# Patient Record
Sex: MALE | Race: WHITE | NOT HISPANIC OR LATINO
[De-identification: names, ages, dates, MRNs, and addresses within clinical notes are randomized per-mention and may not be internally consistent; named-entity substitution may affect disease eponyms.]

---

## 2022-11-08 ENCOUNTER — APPOINTMENT (OUTPATIENT)
Dept: ORTHOPEDIC SURGERY | Facility: CLINIC | Age: 72
End: 2022-11-08

## 2022-11-08 VITALS — WEIGHT: 175 LBS | BODY MASS INDEX: 25.92 KG/M2 | HEIGHT: 69 IN

## 2022-11-08 DIAGNOSIS — Z85.46 PERSONAL HISTORY OF MALIGNANT NEOPLASM OF PROSTATE: ICD-10-CM

## 2022-11-08 DIAGNOSIS — Z78.9 OTHER SPECIFIED HEALTH STATUS: ICD-10-CM

## 2022-11-08 DIAGNOSIS — Z86.79 PERSONAL HISTORY OF OTHER DISEASES OF THE CIRCULATORY SYSTEM: ICD-10-CM

## 2022-11-08 DIAGNOSIS — Z87.19 PERSONAL HISTORY OF OTHER DISEASES OF THE DIGESTIVE SYSTEM: ICD-10-CM

## 2022-11-08 DIAGNOSIS — Z63.5 DISRUPTION OF FAMILY BY SEPARATION AND DIVORCE: ICD-10-CM

## 2022-11-08 PROBLEM — Z00.00 ENCOUNTER FOR PREVENTIVE HEALTH EXAMINATION: Status: ACTIVE | Noted: 2022-11-08

## 2022-11-08 PROCEDURE — 73030 X-RAY EXAM OF SHOULDER: CPT | Mod: RT

## 2022-11-08 PROCEDURE — 99214 OFFICE O/P EST MOD 30 MIN: CPT

## 2022-11-08 PROCEDURE — 99204 OFFICE O/P NEW MOD 45 MIN: CPT

## 2022-11-08 RX ORDER — ATENOLOL 50 MG/1
50 TABLET ORAL
Qty: 90 | Refills: 0 | Status: ACTIVE | COMMUNITY
Start: 2021-09-13

## 2022-11-08 RX ORDER — PANTOPRAZOLE 40 MG/1
40 TABLET, DELAYED RELEASE ORAL
Qty: 90 | Refills: 0 | Status: ACTIVE | COMMUNITY
Start: 2021-09-13

## 2022-11-08 RX ORDER — DOXAZOSIN 4 MG/1
4 TABLET ORAL
Qty: 90 | Refills: 0 | Status: ACTIVE | COMMUNITY
Start: 2022-03-03

## 2022-11-08 SDOH — SOCIAL STABILITY - SOCIAL INSECURITY: DISRUPTION OF FAMILY BY SEPARATION AND DIVORCE: Z63.5

## 2022-11-08 NOTE — REASON FOR VISIT
[FreeTextEntry2] : here today for right shoulder pain.  was seeing Dr Dodd.  has had CSI x2  last 1/22/22.  c/o cant sleep at night, pain is increasing, pain radiating down right arm.

## 2022-11-08 NOTE — PHYSICAL EXAM
[There are no fractures, subluxations or dislocations. No significant abnormalities are seen] : There are no fractures, subluxations or dislocations. No significant abnormalities are seen [Type 2 acromion] : Type 2 acromion [Glenohumeral arthritis] : Glenohumeral arthritis [] : no scapular winging [Right] : right shoulder [FreeTextEntry9] : Flex 140* IR L5* EXT  40* ABD 80*  [de-identified] : biceps 4+, subscap 4+, super 2+ with pain, intra 4 [de-identified] : decreased in median distribution and positive carpal tunnel tinals [FreeTextEntry1] : joint space narrowing. Sclerotic Bone , GH  bone on bone   Osteophyte formation [TWNoteComboBox7] : active forward flexion 110 degrees [de-identified] : active abduction 120 degrees [TWNoteComboBox6] : internal rotation L5 [de-identified] : external rotation 20 degrees

## 2022-11-08 NOTE — DISCUSSION/SUMMARY
[de-identified] : Patient referred to Dr Lundberg for further evaluation regarding right shoulder replacement\par Patient advised to follow up with Dr Braswell for wrist

## 2022-11-11 ENCOUNTER — APPOINTMENT (OUTPATIENT)
Dept: ORTHOPEDIC SURGERY | Facility: CLINIC | Age: 72
End: 2022-11-11

## 2022-11-11 PROCEDURE — 20610 DRAIN/INJ JOINT/BURSA W/O US: CPT | Mod: RT

## 2022-11-11 PROCEDURE — 99214 OFFICE O/P EST MOD 30 MIN: CPT | Mod: 25

## 2022-11-11 NOTE — IMAGING
[de-identified] : Right Shoulder Examination:\par \par • Constitutional: \par    - In no acute distress: yes\par    - Alert and oriented (person, place, time): yes \par \par • Inspection and Palpation: \par    - Skin: intact\par    - Swelling: none\par    - Glenohumeral joint line tenderness: negative\par    - Bicipital groove tenderness: negative\par    - AC joint tenderness: negative\par    - Scapulothoracic tenderness: negative\par    - Cervical spine tenderness: negative\par    - Palpable lymphadenopathy: none \par    - Peripheral edema: none\par \par • Range of Motion (in degrees):\par    - Active forward elevation: 160\par    - Passive forward elevation: 160\par    - External rotation at the side: 40\par    - Internal rotation behind the back: sacrum \par 	\par • Stability:\par    - Apprehension sign: negative\par    - Sulcus sign: negative\par \par • Neurovascular: \par    - Atrophy of rotator cuff: absent \par    - Forward elevation strength (out of 5): 5\par    - External rotation strength at the side (out of 5): 5\par    - Internal rotation strength at the side (out of 5): 5\par    - Abduction strength (out of 5): 5\par    - Sensation to light touch: intact in axillary, median, radial and ulnar distributions\par    - Capillary refill: less than 2 seconds in fingers\par \par • Special Tests:\par    - Stark impingement test: positive\par    - Cross body adduction test: negative \par    - [place]'s test: negative \par    - Speed’s test: negative\par    - Spurling’s test: negative\par    - Belly press test: normal\par    - Hornblower sign: negative\par    - Neck examination: painless range of motion\par \par • Comments:\par    - None \par \par  [Right] : right shoulder [Outside films reviewed] : Outside films reviewed [FreeTextEntry1] : severe glenohumeral joint space narrowing with inferior osteophyte formation. no axillary view is available and patient defers further imaging

## 2022-11-11 NOTE — DISCUSSION/SUMMARY
[de-identified] : Assessment\par \par The patient presents with history, examination and imaging that are most consistent with a diagnosis of:\par right glenohumeral osteoarthritis with exacerbation\par \par We discussed the diagnosis and potential treatment options. He is a candidate for anatomic versus reverse shoulder replacement. We discussed the risks and benefits of each as well as the post-operative rehab. We would need an MRI to evaluate his rotator cuff and CT scan to evaluate glenoid deformity prior to surgery. \par \par Patient would like to prioritize his carpal tunnel surgery and will opt for injection today and home exercise program. We discussed the delay of 3 months between injection and arthroplasty. \par \par The patient is in agreement with the treatment plan and all questions were answered. \par \par ..........\par \par Plan\par \par - Counseling: Patient recommended to modify their activities until symptoms resolve. \par - Home Exercise Program: Packet with exercises from [employer] provided to patient in the office today. \par - Injection: Corticosteroid injection performed today in the office. Refer to procedure section for further details.\par - Follow-up: 6 to 8 weeks if failing to improve\par \par ..........\par \par Medical Decision Making\par \par • Problem:\par    - Chronic illness with exacerbation - moderate\par • Data:\par    - Review of available external records - low\par • Risk:\par    - Injection - moderate\par • MDM Level:\par    - Level 4\par

## 2022-11-11 NOTE — HISTORY OF PRESENT ILLNESS
[de-identified] : The patient presents to clinic for evaluation of the complaint described below.\par \par • Visit Details:\par    - Visit type: new \par    - Worker’s compensation: no\par    - No-fault: no\par \par • Patient Demographics:\par    - Age: 72\par    - Occupation: retired \par    - Sex: male\par \par • Symptom Details: \par    - Laterality: right \par    - Body part: shoulder\par    - Duration: 3 years\par    - Pain severity (out of 10): 7/10\par    - Pain timing: constant \par    - Pain quality: sharp, throbbing\par    - Pain interferes with sleep: yes \par    - Pain radiates distally: yes \par    - Associated with swelling: no\par    - Associated with catching and locking: no\par    - Associated with decreased motion: yes \par    - Associated with numbness: no\par    - Worse with activity: yes\par    - Improves with rest: yes\par \par • Treatment Details:\par    - Physical therapy: no\par    - Anti-inflammatory medication: no\par    - Narcotic medication: no\par    - Corticosteroid injection: yes x2, last in January 2022\par \par • Comments:\par    - Pain is in the front of the shoulder. He had fallen in Jan and landed on his shoulder. Pain is constant and has been getting worse. He was having chronic on and off pain before that. Reports good response to one injection from Dr. Dodd, but no response to the other one. He states he needs right carpal tunnel surgery with Dr. Hooker. No cardiac history, non smoker, no DM. \par

## 2022-11-11 NOTE — PROCEDURE
[FreeTextEntry3] : Procedure Note\par \par • Injection Details: \par    - Laterality: right \par    - Body part: shoulder\par    - Anatomic location: joint \par \par • Injection Contents: \par    - 1st Medication: Kenalog, 40 mg\par    - 2nd Medication: 1% lidocaine, 5 mL\par \par • Procedure Narrative:\par    - Informed consent was provided for injection of the specified location. \par    - Patient informed of corticosteroid risks including hyperglycemia, infection and skin discoloration. \par    - The above specified medications were injected using aseptic technique. \par    - A sterile dressing was applied. \par    - There were no complications. \par \par

## 2022-11-29 ENCOUNTER — APPOINTMENT (OUTPATIENT)
Dept: ORTHOPEDIC SURGERY | Facility: CLINIC | Age: 72
End: 2022-11-29

## 2023-01-06 ENCOUNTER — APPOINTMENT (OUTPATIENT)
Dept: ORTHOPEDIC SURGERY | Facility: CLINIC | Age: 73
End: 2023-01-06
Payer: MEDICARE

## 2023-01-06 PROCEDURE — 99214 OFFICE O/P EST MOD 30 MIN: CPT | Mod: 25

## 2023-01-06 PROCEDURE — 20610 DRAIN/INJ JOINT/BURSA W/O US: CPT | Mod: RT

## 2023-01-06 NOTE — DISCUSSION/SUMMARY
[de-identified] : (Assessment)\par \par History, clinical examination and imaging were most consistent with:\par -right glenohumeral osteoarthritis\par \par The diagnosis was explained in detail. The potential non-surgical and surgical treatments were reviewed. The relative risks and benefits of each option were considered relative to the patient’s age, activity level, medical history, symptom severity and previously attempted treatments. \par \par -The patient has persistent symptoms in spite of non-surgical treatment. Their symptoms have placed a significant burden on their quality of life. The risks, benefits and alternatives of shoulder replacement were discussed, along with the expected timeline for rehabilitation and specifics regarding the most common complications. We discussed the need for pre-operative CT scan. We discussed the rationale and complication profiles for TSA versus RSA, particularly in regards to rotator cuff insufficiency in the setting of TSA and advanced age. He is interested in RSA as a definitive treatment option.\par -He is not interested in surgery until the late spring or early summer to arrange family support. He would like to proceed today with a repeat cortisone injection for symptom relief. he will continue the home exercise program. follow up in 2 months to discuss surgical scheduling and CT scan. \par \par \par (Plan)\par -Cortisone injection performed today\par -Continue home exercise program\par -Follow up in 2 months\par \par (MDM) \par \par Problem Complexity\par -Moderate: chronic illness with exacerbation\par \par Risk\par -Moderate: injection\par \par \par (Injection Procedure Note)\par \par Laterality \par -Right \par \par Location\par -Glenohumeral joint\par \par Contents\par -40 mg kenalog \par -5 mL of 1% lidocaine\par \par Narrative\par -Discussed possible risks of corticosteroid injection including hyperglycemia, infection and skin discoloration. \par -Discussed that due to infection risk, an interval between injection and any future surgery is 6 weeks for arthroscopy and 3 months for arthroplasty.\par -Informed consent was obtained.\par -Injection performed using aseptic technique. Tolerated well with no complications. \par \par

## 2023-01-06 NOTE — HISTORY OF PRESENT ILLNESS
[de-identified] : (Follow-Up Visit) \par \par Symptom Details\par -Body part: RT shoulder \par -Pain score at rest: 4/10\par -Pain score with activity: 5/10\par -Improvement since last visit: pain is slightly improved, but ROM is still limited. he had the CTR in december. he is interested in shoulder replacement but needs to talk to his family about timing, likely not until late spring or summer. \par \par Interval Treatment Details\par -Activity modification: yes\par -Medication: no\par -Physical therapy: previously \par -Home exercise program: every other day\par -Injection: had some relief, he is interested in another one today\par -MRI: no\par \par \par Please refer to previous office visit notes for additional history.\par

## 2023-01-06 NOTE — IMAGING
[Right] : right shoulder [Outside films reviewed] : Outside films reviewed [FreeTextEntry1] : severe glenohumeral joint space narrowing with inferior osteophyte formation. no axillary view is available and patient defers further imaging [de-identified] : Right Shoulder Examination:\par \par • Constitutional: \par    - In no acute distress: yes\par    - Alert and oriented (person, place, time): yes \par \par • Inspection and Palpation: \par    - Skin: intact\par    - Swelling: none\par    - Glenohumeral joint line tenderness: negative\par    - Bicipital groove tenderness: negative\par    - AC joint tenderness: negative\par    - Scapulothoracic tenderness: negative\par    - Cervical spine tenderness: negative\par    - Palpable lymphadenopathy: none \par    - Peripheral edema: none\par \par • Range of Motion (in degrees):\par    - Active forward elevation: 160\par    - Passive forward elevation: 160\par    - External rotation at the side: 40\par    - Internal rotation behind the back: sacrum \par 	\par • Stability:\par    - Apprehension sign: negative\par    - Sulcus sign: negative\par \par • Neurovascular: \par    - Atrophy of rotator cuff: absent \par    - Forward elevation strength (out of 5): 5\par    - External rotation strength at the side (out of 5): 5\par    - Internal rotation strength at the side (out of 5): 5\par    - Abduction strength (out of 5): 5\par    - Sensation to light touch: intact in axillary, median, radial and ulnar distributions\par    - Capillary refill: less than 2 seconds in fingers\par \par • Special Tests:\par    - Stark impingement test: positive\par    - Cross body adduction test: negative \par    - [place]'s test: negative \par    - Speed’s test: negative\par    - Spurling’s test: negative\par    - Belly press test: normal\par    - Hornblower sign: negative\par    - Neck examination: painless range of motion\par \par • Comments:\par    - None \par \par

## 2023-03-03 ENCOUNTER — APPOINTMENT (OUTPATIENT)
Dept: ORTHOPEDIC SURGERY | Facility: CLINIC | Age: 73
End: 2023-03-03
Payer: MEDICARE

## 2023-03-03 PROCEDURE — 99214 OFFICE O/P EST MOD 30 MIN: CPT

## 2023-03-03 NOTE — HISTORY OF PRESENT ILLNESS
[de-identified] : (Follow-Up Visit) \par \par Symptom Details\par -Body part: RT shoulder \par -Pain score at rest: 4/10\par -Pain score with activity: 5/10\par -Improvement since last visit: still has significant pain and limited motion, difficulty with ADL's. he is interested in discussing surgery. has made arrangements with his family, would like to discuss dates in april\par \par Interval Treatment Details\par -Activity modification: yes\par -Medication: no\par -Physical therapy: previously \par -Home exercise program: every other day\par -Injection: no\par -MRI: no\par \par \par Please refer to previous office visit notes for additional history.\par

## 2023-03-03 NOTE — DISCUSSION/SUMMARY
[de-identified] : (Assessment)\par \par History, clinical examination and imaging were most consistent with:\par -right glenohumeral osteoarthritis\par \par The diagnosis was explained in detail. The potential non-surgical and surgical treatments were reviewed. The relative risks and benefits of each option were considered relative to the patient’s age, activity level, medical history, symptom severity and previously attempted treatments. \par \par -The patient has persistent symptoms in spite of non-surgical treatment. Their symptoms have placed a significant burden on their quality of life. The risks, benefits and alternatives of shoulder replacement were discussed, along with the expected timeline for rehabilitation and specifics regarding the most common complications. We discussed the need for pre-operative CT scan. We discussed the rationale and complication profiles for TSA versus RSA, particularly in regards to rotator cuff insufficiency in the setting of TSA and advanced age. He is interested in RSA as a definitive treatment option.\par \par \par (Surgical Plan)\par \par -Procedure: right reverse shoulder arthroplasty\par -Tentative date: april\par -Medical clearance: yes\par -Cardiac clearance: no\par -A CT scan of the shoulder without contrast was ordered for pre-surgical planning. \par -Patient will provide copy of hematology evaluation as he has low platelets without a known cause, will likely have platelets on standby pre-procedure. \par -Follow up: 1-2 weeks after surgery\par \par \par (MDM) \par \par Problem Complexity\par -Moderate: chronic illness with exacerbation\par \par Risk\par -Moderate: pre-surgical discussion

## 2023-03-03 NOTE — IMAGING
[de-identified] : Right Shoulder Examination:\par \par • Constitutional: \par  - In no acute distress: yes\par  - Alert and oriented (person, place, time): yes \par \par • Inspection and Palpation: \par  - Skin: intact\par  - Swelling: none\par  - Glenohumeral joint line tenderness: negative\par  - Bicipital groove tenderness: negative\par  - AC joint tenderness: negative\par  - Scapulothoracic tenderness: negative\par  - Cervical spine tenderness: negative\par  - Palpable lymphadenopathy: none \par  - Peripheral edema: none\par \par • Range of Motion (in degrees):\par  - Active forward elevation: 160\par  - Passive forward elevation: 160\par  - External rotation at the side: 40\par  - Internal rotation behind the back: sacrum \par 	\par • Stability:\par  - Apprehension sign: negative\par  - Sulcus sign: negative\par \par • Neurovascular: \par  - Atrophy of rotator cuff: absent \par  - Forward elevation strength (out of 5): 5-\par  - External rotation strength at the side (out of 5): 5\par  - Internal rotation strength at the side (out of 5): 5\par  - Abduction strength (out of 5): 5\par  - Sensation to light touch: intact in axillary, median, radial and ulnar distributions\par  - Capillary refill: less than 2 seconds in fingers\par \par • Special Tests:\par  - Stark impingement test: positive\par  - Cross body adduction test: negative \par  - [place]'s test: negative \par  - Speed’s test: negative\par  - Spurling’s test: negative\par  - Belly press test: normal\par  - Hornblower sign: negative\par  - Neck examination: painless range of motion\par \par • Comments:\par  - None \par

## 2023-03-06 ENCOUNTER — FORM ENCOUNTER (OUTPATIENT)
Age: 73
End: 2023-03-06

## 2023-03-06 ENCOUNTER — RESULT REVIEW (OUTPATIENT)
Age: 73
End: 2023-03-06

## 2023-03-19 ENCOUNTER — FORM ENCOUNTER (OUTPATIENT)
Age: 73
End: 2023-03-19

## 2023-03-20 ENCOUNTER — NON-APPOINTMENT (OUTPATIENT)
Age: 73
End: 2023-03-20

## 2023-04-03 ENCOUNTER — NON-APPOINTMENT (OUTPATIENT)
Age: 73
End: 2023-04-03

## 2023-04-04 ENCOUNTER — NON-APPOINTMENT (OUTPATIENT)
Age: 73
End: 2023-04-04

## 2023-04-05 ENCOUNTER — FORM ENCOUNTER (OUTPATIENT)
Age: 73
End: 2023-04-05

## 2023-04-06 ENCOUNTER — FORM ENCOUNTER (OUTPATIENT)
Age: 73
End: 2023-04-06

## 2023-04-11 ENCOUNTER — FORM ENCOUNTER (OUTPATIENT)
Age: 73
End: 2023-04-11

## 2023-04-17 ENCOUNTER — FORM ENCOUNTER (OUTPATIENT)
Age: 73
End: 2023-04-17

## 2023-04-17 DIAGNOSIS — M19.011 PRIMARY OSTEOARTHRITIS, RIGHT SHOULDER: ICD-10-CM

## 2023-04-17 RX ORDER — FAMOTIDINE 20 MG/1
20 TABLET, FILM COATED ORAL
Qty: 14 | Refills: 0 | Status: ACTIVE | COMMUNITY
Start: 2023-04-17 | End: 1900-01-01

## 2023-04-17 RX ORDER — ASPIRIN 81 MG/1
81 TABLET, COATED ORAL
Qty: 60 | Refills: 0 | Status: ACTIVE | COMMUNITY
Start: 2023-04-17 | End: 1900-01-01

## 2023-04-17 RX ORDER — MELOXICAM 15 MG/1
15 TABLET ORAL
Qty: 30 | Refills: 0 | Status: ACTIVE | COMMUNITY
Start: 2023-04-17 | End: 1900-01-01

## 2023-04-17 RX ORDER — POLYETHYLENE GLYCOL 3350 17 G/17G
17 POWDER, FOR SOLUTION ORAL
Qty: 7 | Refills: 0 | Status: ACTIVE | COMMUNITY
Start: 2023-04-17 | End: 1900-01-01

## 2023-04-17 RX ORDER — OXYCODONE 5 MG/1
5 TABLET ORAL
Qty: 20 | Refills: 0 | Status: ACTIVE | COMMUNITY
Start: 2023-04-17 | End: 1900-01-01

## 2023-04-18 ENCOUNTER — NON-APPOINTMENT (OUTPATIENT)
Age: 73
End: 2023-04-18

## 2023-04-19 ENCOUNTER — RESULT REVIEW (OUTPATIENT)
Age: 73
End: 2023-04-19

## 2023-04-19 ENCOUNTER — FORM ENCOUNTER (OUTPATIENT)
Age: 73
End: 2023-04-19

## 2023-04-19 ENCOUNTER — APPOINTMENT (OUTPATIENT)
Dept: ORTHOPEDIC SURGERY | Facility: HOSPITAL | Age: 73
End: 2023-04-19
Payer: MEDICARE

## 2023-04-19 PROCEDURE — 23472 RECONSTRUCT SHOULDER JOINT: CPT | Mod: RT

## 2023-04-25 ENCOUNTER — TRANSCRIPTION ENCOUNTER (OUTPATIENT)
Age: 73
End: 2023-04-25

## 2023-05-01 ENCOUNTER — APPOINTMENT (OUTPATIENT)
Dept: ORTHOPEDIC SURGERY | Facility: CLINIC | Age: 73
End: 2023-05-01
Payer: MEDICARE

## 2023-05-01 PROCEDURE — 73030 X-RAY EXAM OF SHOULDER: CPT | Mod: FY,RT

## 2023-05-01 PROCEDURE — 99024 POSTOP FOLLOW-UP VISIT: CPT

## 2023-05-11 ENCOUNTER — RESULT REVIEW (OUTPATIENT)
Age: 73
End: 2023-05-11

## 2023-05-11 NOTE — ADDENDUM
[FreeTextEntry1] : 5/11: Patient contacted the office reporting right calf swelling. No shortness of breath or fevers. He was provided with a script for Duplex to rule out DVT. If positive he will seek immediate medical care from ER or PMD to begin anti-coagulation. If negative he will follow up with his PMD.

## 2023-05-11 NOTE — IMAGING
[Right] : right shoulder [Components well fixed, in good position] : Components well fixed, in good position [de-identified] : (Physical Exam: Shoulder)\par \par Laterality is right \par \par Patient is in no acute distress, alert and oriented\par Sensation is grossly intact to light touch in the hand\par Axillary sensation is intact in the shoulder\par Motor function is 5/5 in the hand\par Deltoid and biceps are firing\par Capillary refill is less than 2 seconds in the fingers\par Lymphadenopathy is not present\par Peripheral edema is not present\par \par Skin is intact \par Incisions are healing well\par Swelling is not present \par Atrophy is not present\par Deformity of the biceps is not present \par  [FreeTextEntry1] : RSA with no fracture, dislocation or loosening

## 2023-05-11 NOTE — HISTORY OF PRESENT ILLNESS
[de-identified] : (History of Present Illness)\par \par Date of surgery was 04/19/2023\par Surgery performed was RT reverse shoulder arthroplasty \par Current pain score is 8/10\par Current medications taken are meloxicam and famotidine. \par Physical therapy is being performed at none scheduled yet\par Reports that yesterday he had some pain in his arm when he moved it but it has been resolving\par

## 2023-05-11 NOTE — DISCUSSION/SUMMARY
[de-identified] : (Assessment and Plan)\par \par The patient has made appropriate post-operative progress and agreed to proceed with the plan of care below. All questions were answered. \par \par -Patient will proceed with physical therapy as per the post-operative protocol.\par -Ibuprofen as needed for pain, GI precautions discussed\par -Aspirin for DVT prophylaxis until 1 month post-operatively\par -Follow up in 4 weeks with XR\par

## 2023-05-31 ENCOUNTER — APPOINTMENT (OUTPATIENT)
Dept: ORTHOPEDIC SURGERY | Facility: CLINIC | Age: 73
End: 2023-05-31
Payer: MEDICARE

## 2023-05-31 PROCEDURE — 73030 X-RAY EXAM OF SHOULDER: CPT | Mod: FY,RT

## 2023-05-31 PROCEDURE — 99024 POSTOP FOLLOW-UP VISIT: CPT

## 2023-05-31 RX ORDER — MELOXICAM 15 MG/1
15 TABLET ORAL
Qty: 90 | Refills: 0 | Status: ACTIVE | COMMUNITY
Start: 2023-05-31 | End: 1900-01-01

## 2023-05-31 NOTE — PHYSICAL EXAM
[Right] : right shoulder [de-identified] : (Physical Exam: Shoulder)\par \par Laterality is right \par \par Patient is in no acute distress, alert and oriented\par Sensation is grossly intact to light touch in the hand\par Axillary sensation is intact in the shoulder\par Motor function is 5/5 in the hand\par Deltoid and biceps are firing\par Capillary refill is less than 2 seconds in the fingers\par Lymphadenopathy is not present\par Peripheral edema is not present\par \par Mild tenderness of the deltoid \par No focal tenderness of the acromion or scapular spine\par \par Skin is intact \par Incisions are healing well\par Swelling is not present \par Atrophy is not present\par Deformity of the biceps is not present \par \par Active forward elevation is 90\par Passive forward elevation is 130\par External rotation at the side is 30\par Internal rotation behind the back is to the level of side pocket\par \par Forward elevation strength is 5-/5\par External rotation strength at the side is 5-/5 \par Internal rotation strength at the side is 5/5 \par Deltoid strength of anterior, posterior and lateral heads is 5/5\par  [FreeTextEntry1] : reverse shoulder replacement with no interval change in position, no loosening or fracture. there is no evidence of acromial or scapular spine fracture

## 2023-05-31 NOTE — HISTORY OF PRESENT ILLNESS
[de-identified] : (History of Present Illness)\par \par Date of surgery was 04/19/2023\par Surgery performed was RT reverse shoulder arthroplasty \par Current pain score is 4/10\par Current medications taken are none\par Physical therapy is being performed at [facility]\par Patient reports that after a vigorous PT session last week he noted some lateral arm soreness that has not completely resolved

## 2023-05-31 NOTE — DISCUSSION/SUMMARY
[de-identified] : (Assessment and Plan)\par \par Patient notes deltoid soreness after increasing his PT regimen. Most consistent with deltoid strain and early acromial stress reaction. I recommended stopping PT for 2 weeks and using the sling during the day. We will restart meloxicam to reduce inflammation.\par \par Follow up in 2 weeks, if symptoms resolve he can resume PT. If symptoms persist or worsen we would consider CT scan. \par \par All questions were answered. \par . \par

## 2023-06-19 ENCOUNTER — APPOINTMENT (OUTPATIENT)
Dept: ORTHOPEDIC SURGERY | Facility: CLINIC | Age: 73
End: 2023-06-19
Payer: MEDICARE

## 2023-06-19 PROCEDURE — 73030 X-RAY EXAM OF SHOULDER: CPT | Mod: FY,RT

## 2023-06-19 PROCEDURE — 99024 POSTOP FOLLOW-UP VISIT: CPT

## 2023-06-19 RX ORDER — TRAMADOL HYDROCHLORIDE 50 MG/1
50 TABLET, COATED ORAL
Qty: 20 | Refills: 0 | Status: ACTIVE | COMMUNITY
Start: 2023-06-19 | End: 1900-01-01

## 2023-06-19 NOTE — HISTORY OF PRESENT ILLNESS
[de-identified] : (History of Present Illness)\par \par Date of surgery was 04/19/2023\par Surgery performed was RT reverse shoulder arthroplasty \par Current pain score is 5/10\par Current medications taken are none\par Physical therapy is being performed at [facility]\par Patient reports that he was using the sling, stopped PT and after a few days his lateral arm pain resolved\par He had resumed his home PT without pain or discomfort\par However, reports that he had a sudden movement 4 days ago trying to remove a tick from his contralateral arm\par he felt a sharp pain in his operative shoulder\par he states that his external rotation is greater since that event\par he still has some lateral arm soreness but it is not as severe\par he has been using the sling for comfort

## 2023-06-19 NOTE — IMAGING
[Right] : right shoulder [FreeTextEntry1] : reverse shoulder prosthesis in stable alignment, no fracture dislocation or loosening

## 2023-06-19 NOTE — PHYSICAL EXAM
[de-identified] : (Physical Exam: Shoulder)\par \par Laterality is right \par \par Patient is in no acute distress, alert and oriented\par Sensation is grossly intact to light touch in the hand\par Axillary sensation is intact in the shoulder\par Motor function is 5/5 in the hand\par Deltoid and biceps are firing\par Capillary refill is less than 2 seconds in the fingers\par Lymphadenopathy is not present\par Peripheral edema is not present\par \par Minimal tenderness of the deltoid \par No focal tenderness of the acromion or scapular spine\par \par Skin is intact \par Incisions are healing well\par Swelling is not present \par Atrophy is not present\par Deformity of the biceps is not present \par \par Active forward elevation is 90\par Passive forward elevation is 130\par External rotation at the side is 60\par Internal rotation behind the back is to the level of side pocket\par \par Forward elevation strength is 5-/5\par External rotation strength at the side is 5-/5 \par Internal rotation strength at the side is 5/5 \par Deltoid strength of anterior, posterior and lateral heads is 5/5\par  [Right] : right shoulder [FreeTextEntry1] : reverse shoulder replacement with no interval change in position, no loosening or fracture. there is no evidence of acromial or scapular spine fracture

## 2023-06-19 NOTE — DISCUSSION/SUMMARY
[de-identified] : (Assessment and Plan)\par \par Patient's deltoid soreness is improved but he had an exacerbation following a sudden movement last week. His symptoms are concerning for subscapularis rupture. We discussed that the reverse prosthesis can function without a subscapularis. \par \par Patient does not have focal acromion tenderness but we will proceed with CT scan to confirm the absence of acromial or scapular stress fracture. \par \par Patient will continue with meloxicam and take tramadol for breakthrough pain. He will continue the sling and can remove for pendulum and gentle range of motion exercises.\par \par Follow up when CT scan completed. If no fracture observed and pain is improved we will re-start formal PT to advance strengthening. Patient does not have any signs of symptoms concerning for infection with a mechanical etiology associated with his pain, and therefore infectious workup is deferred. \par \par All questions were answered. \par

## 2023-06-20 ENCOUNTER — NON-APPOINTMENT (OUTPATIENT)
Age: 73
End: 2023-06-20

## 2023-06-20 ENCOUNTER — RESULT REVIEW (OUTPATIENT)
Age: 73
End: 2023-06-20

## 2023-07-12 ENCOUNTER — APPOINTMENT (OUTPATIENT)
Dept: ORTHOPEDIC SURGERY | Facility: CLINIC | Age: 73
End: 2023-07-12
Payer: MEDICARE

## 2023-07-12 PROCEDURE — 99024 POSTOP FOLLOW-UP VISIT: CPT

## 2023-07-12 NOTE — HISTORY OF PRESENT ILLNESS
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

## 2023-07-12 NOTE — PHYSICAL EXAM
[Right] : right shoulder [de-identified] : (Physical Exam: Shoulder)\par \par Laterality is right \par \par Patient is in no acute distress, alert and oriented\par Sensation is grossly intact to light touch in the hand\par Axillary sensation is intact in the shoulder\par Motor function is 5/5 in the hand\par Deltoid and biceps are firing\par Capillary refill is less than 2 seconds in the fingers\par Lymphadenopathy is not present\par Peripheral edema is not present\par \par No tenderness of the deltoid \par No tenderness of the acromion or scapular spine\par Mild tenderness at AC joint\par \par Skin is intact \par Incisions are healing well\par Swelling is not present \par Atrophy is not present\par Deformity of the biceps is not present \par \par Active forward elevation is 130\par Passive forward elevation is 130\par External rotation at the side is 60\par Internal rotation behind the back is to the level of back pocket\par \par Forward elevation strength is 5-/5\par External rotation strength at the side is 5/5 \par Internal rotation strength at the side is 5/5 \par Deltoid strength of anterior, posterior and lateral heads is 5/5\par  [FreeTextEntry1] : reverse shoulder replacement with no interval change in position, no loosening or fracture. there is no evidence of acromial or scapular spine fracture

## 2023-07-12 NOTE — DATA REVIEWED
[CT Scan] : CT scan [Right] : of the right [Shoulder] : shoulder [Report was reviewed and noted in the chart] : The report was reviewed and noted in the chart [I reviewed the films/CD and agree] : I reviewed the films/CD and agree [FreeTextEntry1] : components in acceptable position, no loosening, no acromion or scapular spine fracture, moderate DJD of AC joint

## 2023-07-12 NOTE — DISCUSSION/SUMMARY
[de-identified] : (Assessment and Plan)\par \par Patient's symptoms are improved, likely from subscapularis rupture. We again discussed that the reverse prosthesis can function without a subscapularis. CT was negative for stress fracture. \par \par He has mild AC joint pain and recommended OTC NSAID as well as topical voltaren gel. We could consider CSI of the ACJ at a future visit if symptoms persisted. \par \par Patient can resume PT, we discussed gradual return to activity and limiting heavy lifting for the next 6 weeks. Patient will follow up in 2 months with XR. \par \par All questions were answered. \par

## 2023-09-13 ENCOUNTER — APPOINTMENT (OUTPATIENT)
Dept: ORTHOPEDIC SURGERY | Facility: CLINIC | Age: 73
End: 2023-09-13
Payer: MEDICARE

## 2023-09-13 VITALS — BODY MASS INDEX: 25.92 KG/M2 | HEIGHT: 69 IN | WEIGHT: 175 LBS

## 2023-09-13 PROCEDURE — 73030 X-RAY EXAM OF SHOULDER: CPT | Mod: FY,RT

## 2023-09-13 PROCEDURE — 99213 OFFICE O/P EST LOW 20 MIN: CPT

## 2024-04-19 ENCOUNTER — APPOINTMENT (OUTPATIENT)
Dept: ORTHOPEDIC SURGERY | Facility: CLINIC | Age: 74
End: 2024-04-19
Payer: MEDICARE

## 2024-04-19 DIAGNOSIS — Z47.1 AFTERCARE FOLLOWING JOINT REPLACEMENT SURGERY: ICD-10-CM

## 2024-04-19 DIAGNOSIS — Z96.611 AFTERCARE FOLLOWING JOINT REPLACEMENT SURGERY: ICD-10-CM

## 2024-04-19 PROCEDURE — 99213 OFFICE O/P EST LOW 20 MIN: CPT

## 2024-04-19 PROCEDURE — 73030 X-RAY EXAM OF SHOULDER: CPT | Mod: RT

## 2024-05-23 ENCOUNTER — APPOINTMENT (OUTPATIENT)
Dept: ORTHOPEDIC SURGERY | Facility: CLINIC | Age: 74
End: 2024-05-23
Payer: MEDICARE

## 2024-05-23 VITALS — HEIGHT: 69 IN | WEIGHT: 175 LBS | BODY MASS INDEX: 25.92 KG/M2

## 2024-05-23 PROCEDURE — 20610 DRAIN/INJ JOINT/BURSA W/O US: CPT | Mod: RT

## 2024-05-23 PROCEDURE — 73564 X-RAY EXAM KNEE 4 OR MORE: CPT | Mod: RT

## 2024-05-23 PROCEDURE — 99214 OFFICE O/P EST MOD 30 MIN: CPT

## 2024-05-23 NOTE — PROCEDURE
[Large Joint Injection] : Large joint injection [Right] : of the right [Knee] : knee [Pain] : pain [Inflammation] : inflammation [X-ray evidence of Osteoarthritis on this or prior visit] : x-ray evidence of Osteoarthritis on this or prior visit [Repeat series performed] : repeat series performed [Betadine] : betadine [Ethyl Chloride sprayed topically] : ethyl chloride sprayed topically [Sterile technique used] : sterile technique used [Gel-Syn (16.8mg)] : 16.8mg of Gel-Syn [#1] : series #1 [Effusion] : effusion [] : Patient tolerated procedure well [Call if redness, pain or fever occur] : call if redness, pain or fever occur [Apply ice for 15min out of every hour for the next 12-24 hours as tolerated] : apply ice for 15 minutes out of every hour for the next 12-24 hours as tolerated [Previous OTC use and PT nontherapeutic] : patient has tried OTC's including aspirin, Ibuprofen, Aleve, etc or prescription NSAIDS, and/or exercises at home and/or physical therapy without satisfactory response [Patient had decreased mobility in the joint] : patient had decreased mobility in the joint [Risks, benefits, alternatives discussed / Verbal consent obtained] : the risks benefits, and alternatives have been discussed, and verbal consent was obtained [de-identified] : 40cc [de-identified] : Serous Fluid

## 2024-05-23 NOTE — PHYSICAL EXAM
[] : no extensor lag [Right] : right knee [All Views] : anteroposterior, lateral, skyline, and anteroposterior standing [Advanced patellofemoral OA] : Advanced patellofemoral OA [FreeTextEntry8] : Patella tracking laterally. M>Lateral Joint ,  Lat PF worse  [de-identified] : Patella tracking laterally.  [FreeTextEntry9] : Lateral joint space narrowing. Sclerosis of the lateral knee. Valgus deformity. Severe DJD lateral compartment. Tricompartmental osteophyte formation. No fractures seen.       [TWNoteComboBox7] : flexion 120 degrees [de-identified] : extension 7 degrees

## 2024-05-23 NOTE — HISTORY OF PRESENT ILLNESS
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 yes Have you had surgery for this in the past? no OTC Medicines: no NSAIDS with low Platyusuf RX medicines: occ Tylenol  Heat, Ice, Elevation: elevation CSI or Visco supplementation Injections:  no Physical Therapy/  Non-impact exercise program : no Pain: At Rest:  1/10 With Activity:8 /10 Affecting Sleep: no Difficulty with stairs: yes Difficulty getting in and out of car: no Sit to stand stiffness: yes Affects walking short/long distances? yes Home/Work/Recreation effected? no Mechanism of injury: none This is not a Work-Related Injury being treated under Worker's Compensation. This is not   an athletic injury occurring associated with an interscholastic or organized sports team. Quality of symptoms: insatiably, buckling Improves with: N/A Worse with: walking  Previous Treatment/Imaging/Studies Since Last Visit: [place] MRI 2012 Reports Available for Review Today: no

## 2024-05-23 NOTE — DISCUSSION/SUMMARY
[de-identified] : Lengthy discussion regarding options was had with the patient. Nonsurgical options including but not limited to cortisone, Visco supplementation, Prescription anti-inflammatory medications (both steroidal and non-steroidal), activity modification, non-impact exercise, maintaining a healthy BMI, bracing, and icing were reviewed. Surgical options including but not limited to arthroscopy, and joint replacement were discussed as was risks, benefits and alternatives.   The patient was advised of the diagnosis. The natural history of the pathology was explained in full to the patient in layman's terms. Several different treatment options were discussed and explained in full to the patient including the risks and benefits of both surgical and non-surgical treatments. All questions and concerns were answered.    Low Platelets , Prescription anti-inflammatory medications not Indicated   and PT  for ROM and Strength   will opt for   Visco supplementation inj  today   Patient was also asspirated rior to gel injection due to increased swelling in and arounf the knee.  f/u 1 week

## 2024-05-31 ENCOUNTER — APPOINTMENT (OUTPATIENT)
Dept: ORTHOPEDIC SURGERY | Facility: CLINIC | Age: 74
End: 2024-05-31
Payer: MEDICARE

## 2024-05-31 PROCEDURE — 20610 DRAIN/INJ JOINT/BURSA W/O US: CPT | Mod: RT

## 2024-05-31 NOTE — REASON FOR VISIT
[FreeTextEntry2] : here for gelsyn inj right knee #2.  states clicking is better and has had some pain relief.

## 2024-05-31 NOTE — PROCEDURE
[Large Joint Injection] : Large joint injection [Right] : of the right [Knee] : knee [Pain] : pain [Inflammation] : inflammation [X-ray evidence of Osteoarthritis on this or prior visit] : x-ray evidence of Osteoarthritis on this or prior visit [Repeat series performed] : repeat series performed [Betadine] : betadine [Ethyl Chloride sprayed topically] : ethyl chloride sprayed topically [Sterile technique used] : sterile technique used [Gel-Syn (16.8mg)] : 16.8mg of Gel-Syn [#2] : series #2 [Effusion] : effusion [] : Patient tolerated procedure well [Call if redness, pain or fever occur] : call if redness, pain or fever occur [Apply ice for 15min out of every hour for the next 12-24 hours as tolerated] : apply ice for 15 minutes out of every hour for the next 12-24 hours as tolerated [Previous OTC use and PT nontherapeutic] : patient has tried OTC's including aspirin, Ibuprofen, Aleve, etc or prescription NSAIDS, and/or exercises at home and/or physical therapy without satisfactory response [Patient had decreased mobility in the joint] : patient had decreased mobility in the joint [Risks, benefits, alternatives discussed / Verbal consent obtained] : the risks benefits, and alternatives have been discussed, and verbal consent was obtained [de-identified] : 40cc [de-identified] : Serous Fluid

## 2024-05-31 NOTE — PHYSICAL EXAM
[] : no extensor lag [Right] : right knee [All Views] : anteroposterior, lateral, skyline, and anteroposterior standing [Advanced patellofemoral OA] : Advanced patellofemoral OA [FreeTextEntry8] : Patella tracking laterally. M>Lateral Joint ,  Lat PF worse  [de-identified] : Patella tracking laterally.  [FreeTextEntry9] : Lateral joint space narrowing. Sclerosis of the lateral knee. Valgus deformity. Severe DJD lateral compartment. Tricompartmental osteophyte formation. No fractures seen.       [TWNoteComboBox7] : flexion 120 degrees [de-identified] : extension 7 degrees

## 2024-06-07 ENCOUNTER — APPOINTMENT (OUTPATIENT)
Dept: ORTHOPEDIC SURGERY | Facility: CLINIC | Age: 74
End: 2024-06-07
Payer: MEDICARE

## 2024-06-07 DIAGNOSIS — M17.11 UNILATERAL PRIMARY OSTEOARTHRITIS, RIGHT KNEE: ICD-10-CM

## 2024-06-07 PROCEDURE — 20610 DRAIN/INJ JOINT/BURSA W/O US: CPT | Mod: RT

## 2024-06-07 NOTE — PHYSICAL EXAM
[] : no extensor lag [Right] : right knee [All Views] : anteroposterior, lateral, skyline, and anteroposterior standing [Advanced patellofemoral OA] : Advanced patellofemoral OA [FreeTextEntry8] : Patella tracking laterally. M>Lateral Joint ,  Lat PF worse  [de-identified] : Patella tracking laterally.  [FreeTextEntry9] : Lateral joint space narrowing. Sclerosis of the lateral knee. Valgus deformity. Severe DJD lateral compartment. Tricompartmental osteophyte formation. No fractures seen.       [TWNoteComboBox7] : flexion 120 degrees [de-identified] : extension 7 degrees

## 2024-06-07 NOTE — REASON FOR VISIT
[FreeTextEntry2] : here for right knee gelsyn #3.  states clicking is less, swelling is less and he is sleeping better.

## 2024-06-07 NOTE — PROCEDURE
[Large Joint Injection] : Large joint injection [Right] : of the right [Knee] : knee [Pain] : pain [Inflammation] : inflammation [X-ray evidence of Osteoarthritis on this or prior visit] : x-ray evidence of Osteoarthritis on this or prior visit [Repeat series performed] : repeat series performed [Betadine] : betadine [Ethyl Chloride sprayed topically] : ethyl chloride sprayed topically [Sterile technique used] : sterile technique used [Gel-Syn (16.8mg)] : 16.8mg of Gel-Syn [#3] : series #3 [Effusion] : effusion [] : Patient tolerated procedure well [Call if redness, pain or fever occur] : call if redness, pain or fever occur [Apply ice for 15min out of every hour for the next 12-24 hours as tolerated] : apply ice for 15 minutes out of every hour for the next 12-24 hours as tolerated [Previous OTC use and PT nontherapeutic] : patient has tried OTC's including aspirin, Ibuprofen, Aleve, etc or prescription NSAIDS, and/or exercises at home and/or physical therapy without satisfactory response [Patient had decreased mobility in the joint] : patient had decreased mobility in the joint [Risks, benefits, alternatives discussed / Verbal consent obtained] : the risks benefits, and alternatives have been discussed, and verbal consent was obtained [de-identified] : 40cc [de-identified] : Serous Fluid

## 2024-12-10 ENCOUNTER — APPOINTMENT (OUTPATIENT)
Dept: ORTHOPEDIC SURGERY | Facility: CLINIC | Age: 74
End: 2024-12-10
Payer: MEDICARE

## 2024-12-10 VITALS — WEIGHT: 175 LBS | BODY MASS INDEX: 25.92 KG/M2 | HEIGHT: 69 IN

## 2024-12-10 DIAGNOSIS — M17.11 UNILATERAL PRIMARY OSTEOARTHRITIS, RIGHT KNEE: ICD-10-CM

## 2024-12-10 PROCEDURE — 99214 OFFICE O/P EST MOD 30 MIN: CPT | Mod: 25

## 2024-12-10 PROCEDURE — 20610 DRAIN/INJ JOINT/BURSA W/O US: CPT | Mod: RT
